# Patient Record
Sex: FEMALE | Race: WHITE | NOT HISPANIC OR LATINO
[De-identification: names, ages, dates, MRNs, and addresses within clinical notes are randomized per-mention and may not be internally consistent; named-entity substitution may affect disease eponyms.]

---

## 2017-03-19 ENCOUNTER — TRANSCRIPTION ENCOUNTER (OUTPATIENT)
Age: 67
End: 2017-03-19

## 2017-03-20 ENCOUNTER — OUTPATIENT (OUTPATIENT)
Dept: OUTPATIENT SERVICES | Facility: HOSPITAL | Age: 67
LOS: 1 days | End: 2017-03-20
Payer: COMMERCIAL

## 2017-03-20 DIAGNOSIS — Z12.11 ENCOUNTER FOR SCREENING FOR MALIGNANT NEOPLASM OF COLON: ICD-10-CM

## 2017-03-20 PROCEDURE — G0105: CPT

## 2018-04-12 PROBLEM — Z00.00 ENCOUNTER FOR PREVENTIVE HEALTH EXAMINATION: Status: ACTIVE | Noted: 2018-04-12

## 2018-04-13 ENCOUNTER — APPOINTMENT (OUTPATIENT)
Dept: DERMATOLOGY | Facility: CLINIC | Age: 68
End: 2018-04-13
Payer: COMMERCIAL

## 2018-04-13 PROCEDURE — 99213 OFFICE O/P EST LOW 20 MIN: CPT

## 2018-12-01 ENCOUNTER — APPOINTMENT (OUTPATIENT)
Dept: DERMATOLOGY | Facility: CLINIC | Age: 68
End: 2018-12-01
Payer: COMMERCIAL

## 2018-12-01 ENCOUNTER — RESULT REVIEW (OUTPATIENT)
Age: 68
End: 2018-12-01

## 2018-12-01 PROCEDURE — 99213 OFFICE O/P EST LOW 20 MIN: CPT | Mod: 25

## 2018-12-01 PROCEDURE — 11422 EXC H-F-NK-SP B9+MARG 1.1-2: CPT

## 2018-12-01 PROCEDURE — 11100 BX SKIN SUBCUTANEOUS&/MUCOUS MEMBRANE 1 LESION: CPT | Mod: 59

## 2018-12-15 ENCOUNTER — APPOINTMENT (OUTPATIENT)
Dept: DERMATOLOGY | Facility: CLINIC | Age: 68
End: 2018-12-15
Payer: MEDICARE

## 2018-12-15 PROCEDURE — ZZZZZ: CPT

## 2021-12-03 ENCOUNTER — EMERGENCY (EMERGENCY)
Facility: HOSPITAL | Age: 71
LOS: 1 days | Discharge: HOME | End: 2021-12-03
Attending: EMERGENCY MEDICINE
Payer: MEDICARE

## 2021-12-03 VITALS
DIASTOLIC BLOOD PRESSURE: 67 MMHG | HEART RATE: 90 BPM | OXYGEN SATURATION: 98 % | SYSTOLIC BLOOD PRESSURE: 134 MMHG | RESPIRATION RATE: 18 BRPM | TEMPERATURE: 98 F

## 2021-12-03 VITALS
RESPIRATION RATE: 16 BRPM | HEART RATE: 101 BPM | DIASTOLIC BLOOD PRESSURE: 90 MMHG | WEIGHT: 192.02 LBS | TEMPERATURE: 98 F | SYSTOLIC BLOOD PRESSURE: 166 MMHG | OXYGEN SATURATION: 98 %

## 2021-12-03 DIAGNOSIS — D23.5 OTHER BENIGN NEOPLASM OF SKIN OF TRUNK: Chronic | ICD-10-CM

## 2021-12-03 LAB
ALBUMIN SERPL ELPH-MCNC: 4.5 G/DL — SIGNIFICANT CHANGE UP (ref 3.3–5.2)
ALP SERPL-CCNC: 98 U/L — SIGNIFICANT CHANGE UP (ref 40–120)
ALT FLD-CCNC: 14 U/L — SIGNIFICANT CHANGE UP
ANION GAP SERPL CALC-SCNC: 21 MMOL/L — HIGH (ref 5–17)
APPEARANCE UR: CLEAR — SIGNIFICANT CHANGE UP
APTT BLD: 29 SEC — SIGNIFICANT CHANGE UP (ref 27.5–35.5)
AST SERPL-CCNC: 18 U/L — SIGNIFICANT CHANGE UP
BACTERIA # UR AUTO: NEGATIVE — SIGNIFICANT CHANGE UP
BASOPHILS # BLD AUTO: 0.04 K/UL — SIGNIFICANT CHANGE UP (ref 0–0.2)
BASOPHILS NFR BLD AUTO: 0.6 % — SIGNIFICANT CHANGE UP (ref 0–2)
BILIRUB SERPL-MCNC: 0.3 MG/DL — LOW (ref 0.4–2)
BILIRUB UR-MCNC: NEGATIVE — SIGNIFICANT CHANGE UP
BUN SERPL-MCNC: 12.3 MG/DL — SIGNIFICANT CHANGE UP (ref 8–20)
CALCIUM SERPL-MCNC: 9.8 MG/DL — SIGNIFICANT CHANGE UP (ref 8.6–10.2)
CHLORIDE SERPL-SCNC: 106 MMOL/L — SIGNIFICANT CHANGE UP (ref 98–107)
CO2 SERPL-SCNC: 21 MMOL/L — LOW (ref 22–29)
COLOR SPEC: YELLOW — SIGNIFICANT CHANGE UP
CREAT SERPL-MCNC: 0.68 MG/DL — SIGNIFICANT CHANGE UP (ref 0.5–1.3)
DIFF PNL FLD: NEGATIVE — SIGNIFICANT CHANGE UP
EOSINOPHIL # BLD AUTO: 0.07 K/UL — SIGNIFICANT CHANGE UP (ref 0–0.5)
EOSINOPHIL NFR BLD AUTO: 1 % — SIGNIFICANT CHANGE UP (ref 0–6)
EPI CELLS # UR: SIGNIFICANT CHANGE UP
GLUCOSE SERPL-MCNC: 121 MG/DL — HIGH (ref 70–99)
GLUCOSE UR QL: NEGATIVE MG/DL — SIGNIFICANT CHANGE UP
HCT VFR BLD CALC: 43.9 % — SIGNIFICANT CHANGE UP (ref 34.5–45)
HGB BLD-MCNC: 14.3 G/DL — SIGNIFICANT CHANGE UP (ref 11.5–15.5)
IMM GRANULOCYTES NFR BLD AUTO: 0.1 % — SIGNIFICANT CHANGE UP (ref 0–1.5)
INR BLD: 1.04 RATIO — SIGNIFICANT CHANGE UP (ref 0.88–1.16)
KETONES UR-MCNC: NEGATIVE — SIGNIFICANT CHANGE UP
LEUKOCYTE ESTERASE UR-ACNC: NEGATIVE — SIGNIFICANT CHANGE UP
LYMPHOCYTES # BLD AUTO: 1.84 K/UL — SIGNIFICANT CHANGE UP (ref 1–3.3)
LYMPHOCYTES # BLD AUTO: 27.1 % — SIGNIFICANT CHANGE UP (ref 13–44)
MCHC RBC-ENTMCNC: 29.9 PG — SIGNIFICANT CHANGE UP (ref 27–34)
MCHC RBC-ENTMCNC: 32.6 GM/DL — SIGNIFICANT CHANGE UP (ref 32–36)
MCV RBC AUTO: 91.8 FL — SIGNIFICANT CHANGE UP (ref 80–100)
MONOCYTES # BLD AUTO: 0.32 K/UL — SIGNIFICANT CHANGE UP (ref 0–0.9)
MONOCYTES NFR BLD AUTO: 4.7 % — SIGNIFICANT CHANGE UP (ref 2–14)
NEUTROPHILS # BLD AUTO: 4.5 K/UL — SIGNIFICANT CHANGE UP (ref 1.8–7.4)
NEUTROPHILS NFR BLD AUTO: 66.5 % — SIGNIFICANT CHANGE UP (ref 43–77)
NITRITE UR-MCNC: NEGATIVE — SIGNIFICANT CHANGE UP
PH UR: 6.5 — SIGNIFICANT CHANGE UP (ref 5–8)
PLATELET # BLD AUTO: 275 K/UL — SIGNIFICANT CHANGE UP (ref 150–400)
POTASSIUM SERPL-MCNC: 4.3 MMOL/L — SIGNIFICANT CHANGE UP (ref 3.5–5.3)
POTASSIUM SERPL-SCNC: 4.3 MMOL/L — SIGNIFICANT CHANGE UP (ref 3.5–5.3)
PROT SERPL-MCNC: 8 G/DL — SIGNIFICANT CHANGE UP (ref 6.6–8.7)
PROT UR-MCNC: 15 MG/DL
PROTHROM AB SERPL-ACNC: 12 SEC — SIGNIFICANT CHANGE UP (ref 10.6–13.6)
RBC # BLD: 4.78 M/UL — SIGNIFICANT CHANGE UP (ref 3.8–5.2)
RBC # FLD: 12.2 % — SIGNIFICANT CHANGE UP (ref 10.3–14.5)
RBC CASTS # UR COMP ASSIST: NEGATIVE /HPF — SIGNIFICANT CHANGE UP (ref 0–4)
SODIUM SERPL-SCNC: 148 MMOL/L — HIGH (ref 135–145)
SP GR SPEC: 1.01 — SIGNIFICANT CHANGE UP (ref 1.01–1.02)
TROPONIN T SERPL-MCNC: <0.01 NG/ML — SIGNIFICANT CHANGE UP (ref 0–0.06)
UROBILINOGEN FLD QL: NEGATIVE MG/DL — SIGNIFICANT CHANGE UP
WBC # BLD: 6.78 K/UL — SIGNIFICANT CHANGE UP (ref 3.8–10.5)
WBC # FLD AUTO: 6.78 K/UL — SIGNIFICANT CHANGE UP (ref 3.8–10.5)
WBC UR QL: NEGATIVE — SIGNIFICANT CHANGE UP

## 2021-12-03 PROCEDURE — 71045 X-RAY EXAM CHEST 1 VIEW: CPT | Mod: 26

## 2021-12-03 PROCEDURE — 93005 ELECTROCARDIOGRAM TRACING: CPT

## 2021-12-03 PROCEDURE — 85610 PROTHROMBIN TIME: CPT

## 2021-12-03 PROCEDURE — 93010 ELECTROCARDIOGRAM REPORT: CPT

## 2021-12-03 PROCEDURE — 84484 ASSAY OF TROPONIN QUANT: CPT

## 2021-12-03 PROCEDURE — 99285 EMERGENCY DEPT VISIT HI MDM: CPT

## 2021-12-03 PROCEDURE — 81001 URINALYSIS AUTO W/SCOPE: CPT

## 2021-12-03 PROCEDURE — 80053 COMPREHEN METABOLIC PANEL: CPT

## 2021-12-03 PROCEDURE — 70450 CT HEAD/BRAIN W/O DYE: CPT | Mod: 26,MA

## 2021-12-03 PROCEDURE — 85730 THROMBOPLASTIN TIME PARTIAL: CPT

## 2021-12-03 PROCEDURE — 82962 GLUCOSE BLOOD TEST: CPT

## 2021-12-03 PROCEDURE — 71045 X-RAY EXAM CHEST 1 VIEW: CPT

## 2021-12-03 PROCEDURE — 70450 CT HEAD/BRAIN W/O DYE: CPT | Mod: MA

## 2021-12-03 PROCEDURE — 99284 EMERGENCY DEPT VISIT MOD MDM: CPT | Mod: 25

## 2021-12-03 PROCEDURE — 36415 COLL VENOUS BLD VENIPUNCTURE: CPT

## 2021-12-03 PROCEDURE — 85025 COMPLETE CBC W/AUTO DIFF WBC: CPT

## 2021-12-03 RX ORDER — MECLIZINE HCL 12.5 MG
25 TABLET ORAL ONCE
Refills: 0 | Status: COMPLETED | OUTPATIENT
Start: 2021-12-03 | End: 2021-12-03

## 2021-12-03 RX ORDER — MECLIZINE HCL 12.5 MG
1 TABLET ORAL
Qty: 30 | Refills: 0
Start: 2021-12-03 | End: 2021-12-12

## 2021-12-03 RX ORDER — SODIUM CHLORIDE 9 MG/ML
1000 INJECTION INTRAMUSCULAR; INTRAVENOUS; SUBCUTANEOUS
Refills: 0 | Status: DISCONTINUED | OUTPATIENT
Start: 2021-12-03 | End: 2021-12-07

## 2021-12-03 RX ORDER — SODIUM CHLORIDE 9 MG/ML
1000 INJECTION INTRAMUSCULAR; INTRAVENOUS; SUBCUTANEOUS ONCE
Refills: 0 | Status: COMPLETED | OUTPATIENT
Start: 2021-12-03 | End: 2021-12-03

## 2021-12-03 RX ORDER — DIAZEPAM 5 MG
2 TABLET ORAL ONCE
Refills: 0 | Status: DISCONTINUED | OUTPATIENT
Start: 2021-12-03 | End: 2021-12-03

## 2021-12-03 RX ADMIN — SODIUM CHLORIDE 1000 MILLILITER(S): 9 INJECTION INTRAMUSCULAR; INTRAVENOUS; SUBCUTANEOUS at 16:32

## 2021-12-03 RX ADMIN — SODIUM CHLORIDE 100 MILLILITER(S): 9 INJECTION INTRAMUSCULAR; INTRAVENOUS; SUBCUTANEOUS at 14:44

## 2021-12-03 RX ADMIN — Medication 25 MILLIGRAM(S): at 16:32

## 2021-12-03 RX ADMIN — Medication 2 MILLIGRAM(S): at 14:41

## 2021-12-03 NOTE — ED PROVIDER NOTE - NS ED ROS FT
Gen: chills this morning; no recent illness  HEENT: no vision changes; slight sore throat  CV: Denies chest pain, palpitations  Skin: Denies rash, erythema, color changes  Resp: Denies SOB, cough  GI: nausea, loose stools; no abdominal pain  Msk: L arm tingling  : Denies dysuria, increased frequency  Neuro: dizziness and room spinning per HPI; no LOC reported, no visual changes  ROS statement: all other ROS negative except as per HPI

## 2021-12-03 NOTE — ED ADULT NURSE NOTE - CHIEF COMPLAINT QUOTE
Pt arrives from her PMD's office speaking coherently and following commands and states she has had on and off dizziness for "years" but yesterday had sudden onset dizziness with "room spinning" and generalized b/l weakness. Pt reports improvement at this time and is noted to have equal strength bilaterally.

## 2021-12-03 NOTE — ED PROVIDER NOTE - PATIENT PORTAL LINK FT
You can access the FollowMyHealth Patient Portal offered by [facility] by registering at the following website: [email or web address]. By joining [facility] FollowMyHealth portal, you will also be able to view your health information using other applications (apps) compatible with our system.

## 2021-12-03 NOTE — ED PROVIDER NOTE - ATTENDING CONTRIBUTION TO CARE
Pt with long hx dizziness presents to ER with dizziness and abnormal ekg  Pt went to sleep around midnight. She woke up and turned onto her side and became dizzy and sweaty. This was followed by nausea. She sat up for a minute and then went to the bathroom where she sat down and leaned forward. This is what she does when she gets dizzy and she states it helps her. She went downstairs and sat in the lounge chair and had coffee. She called her pcp Dr Hyman who had her go to the office. In the office she had an ekg that was "abnormal" with ST changes ( requested to send to ER not yet received). Pt unsure if she had tingling in her LUE with episode. Pt FHX + MI mother cause of death. PE as doc Need CT EKG CXR lab

## 2021-12-03 NOTE — ED ADULT NURSE NOTE - NSIMPLEMENTINTERV_GEN_ALL_ED
Implemented All Universal Safety Interventions:  [place] to call system. Call bell, personal items and telephone within reach. Instruct patient to call for assistance. Room bathroom lighting operational. Non-slip footwear when patient is off stretcher. Physically safe environment: no spills, clutter or unnecessary equipment. Stretcher in lowest position, wheels locked, appropriate side rails in place.

## 2021-12-03 NOTE — ED PROVIDER NOTE - OBJECTIVE STATEMENT
70 y/o active woman PMHx of Htn, vertigo, was sent to ED from PMD for concern of EKG changes after she experienced nausea, sweating, and dizziness this morning after waking up. Pt says she has had symptoms of dizziness for years, exacerbated by position/lying flat. Recently she has been functional, biking several miles yesterday per usual. Yesterday she woke up and felt transiently dizzy. This AM she experienced a room spinning sensation and started sweating/felt nauseous when she woke with some tingling in her left arm/hand. She made her way to the toilet and had "loose stools" unsure of appearance. She felt better after showering/getting dress and drove herself to her PMD's office. was sent here for concerning EKG changes. Denies visual disturbances and no LOC.  (+) nausea/sweating/brief chills/loose stools, room spinning  (-) CP, SOB, trouble with exertion, recent illness, recent head trauma, LOC  Dr. Jina Hyman said ST changes seen; did not have opportunity to inquire about pt medical/surgical Hx of prior chest tumor/ Hx of abnormal brain MRI on phone 70 y/o active woman PMHx of Htn, vertigo, was sent to ED from PMD for concern of EKG changes after she experienced nausea, sweating, and dizziness this morning after waking up. Pt says she has had symptoms of dizziness for years, exacerbated by position/lying flat. Recently she has been functional, biking several miles yesterday per usual. Yesterday she woke up and felt transiently dizzy. This AM she experienced a room spinning sensation and started sweating/felt nauseous when she woke with some tingling in her left arm/hand. She made her way to the toilet and had "loose stools" unsure of appearance. She felt better after showering/getting dress and drove herself to her PMD's office. was sent here for concerning EKG changes. Denies visual disturbances and no LOC.  (+) nausea/sweating/brief chills/loose stools, room spinning  (-) CP, SOB, trouble with exertion, recent illness, recent head trauma, LOC  Dr. Jina Hyman said ST changes seen; did not have opportunity to inquire about pt medical/surgical Hx of breast cyst/Hx of abnormal brain MRI on phone 72 y/o active woman PMHx of Htn, vertigo, was sent to ED from PMD for concern of EKG changes after she experienced nausea, sweating, and dizziness this morning after waking up. Pt says she has had symptoms of dizziness for years, exacerbated by position/lying flat. Recently she has been functional, biking several miles yesterday per usual. Yesterday she woke up and felt transiently dizzy. This AM she experienced a room spinning sensation and started sweating/felt nauseous when she woke with some tingling in her left arm/hand. She made her way to the toilet and had "loose stools" unsure of appearance. She felt better after showering/getting dress and drove herself to her PMD's office. was sent here for concerning EKG changes. Denies visual disturbances and no LOC.  (+) nausea/sweating/brief chills/loose stools, room spinning  (-) CP, SOB, trouble with exertion, recent illness, recent head trauma, LOC  Dr. Jina Hyman said ST changes seen; pt taking ramipril 10; previous 2016 Brain MRI with "parietal white matter changes"

## 2021-12-03 NOTE — ED PROVIDER NOTE - NEUROLOGICAL, MLM
alert and oriented; CN II - XII intact; extremity strength grossly intact alert and oriented; CN II - XII intact; extremity strength grossly intact Horizontal nystagmus ou

## 2021-12-03 NOTE — ED ADULT NURSE NOTE - OBJECTIVE STATEMENT
A&Ox3. sitting up in stretcher presents to ED with dizziness  placed on cardiac monitoring  no s/s of distress  awaiting further MD eval and disposition

## 2021-12-03 NOTE — ED PROVIDER NOTE - CARE PROVIDER_API CALL
Ambrocio Andrade (DO)  Otolaryngology  [address] [place] [address], [place] [address]  Phone: [phone]  Fax: [phone]  Follow Up Time:

## 2021-12-03 NOTE — ED PROVIDER NOTE - CLINICAL SUMMARY MEDICAL DECISION MAKING FREE TEXT BOX
70 y/o woman, Hx HTN and vertigo, presenting with nausea/dizziness/diaphoresis and L arm tingling with abnormal EKG ST changes detected at PMD. Exam reveals tachycardia. R/O ACS and stroke, less likely. Consider infectious contributor. EKG/upright CXR/trop/CMP/CBC/UA/ CT Head

## 2021-12-03 NOTE — ED PROVIDER NOTE - PROGRESS NOTE DETAILS
Pt no longer feeling nauseated, still dizzy. Was able to tolerate positional changes with minimal symptoms, no longer tachycardic.  EKG shows Pt no longer feeling symptoms, expressing concerns that symptoms will recur if she lies flat or when she wakes up again tomorrow morning. Was able to tolerate moderate/slow positional changes with minimal symptoms during exam. Administered meclozine for symptom control.  Tachycardia resolved. L hand tingling resolved with removing watch on L wrist. Trop neg; EKG showed sinus rhythm with subtle L axis deviation, normal intervals, no ST changes, to T wave inversions, poor R wave progression. Discharge pending CT Head.   EKG shows

## 2021-12-03 NOTE — ED PROVIDER NOTE - NSFOLLOWUPINSTRUCTIONS_ED_ALL_ED_FT
Antivert ( meclizine) one pill three times a day for dizziness  May cause drowsiness  Please follow up with the ENT specialist, Dr Andrade

## 2022-10-10 PROBLEM — E78.5 HYPERLIPIDEMIA, UNSPECIFIED: Chronic | Status: ACTIVE | Noted: 2021-12-03

## 2022-10-10 PROBLEM — R42 DIZZINESS AND GIDDINESS: Chronic | Status: ACTIVE | Noted: 2021-12-03

## 2022-11-04 ENCOUNTER — APPOINTMENT (OUTPATIENT)
Dept: DERMATOLOGY | Facility: CLINIC | Age: 72
End: 2022-11-04

## 2022-11-04 PROCEDURE — 11900 INJECT SKIN LESIONS </W 7: CPT

## 2022-11-04 PROCEDURE — 99203 OFFICE O/P NEW LOW 30 MIN: CPT | Mod: 25

## 2022-12-13 ENCOUNTER — RESULT REVIEW (OUTPATIENT)
Age: 72
End: 2022-12-13

## 2022-12-14 ENCOUNTER — APPOINTMENT (OUTPATIENT)
Dept: DERMATOLOGY | Facility: CLINIC | Age: 72
End: 2022-12-14

## 2022-12-14 PROCEDURE — 10040 EXTRACTION: CPT

## 2022-12-14 PROCEDURE — 13101 CMPLX RPR TRUNK 2.6-7.5 CM: CPT | Mod: 59

## 2022-12-14 PROCEDURE — 11402 EXC TR-EXT B9+MARG 1.1-2 CM: CPT

## 2022-12-14 PROCEDURE — 99212 OFFICE O/P EST SF 10 MIN: CPT | Mod: 25

## 2022-12-16 ENCOUNTER — APPOINTMENT (OUTPATIENT)
Dept: DERMATOLOGY | Facility: CLINIC | Age: 72
End: 2022-12-16

## 2022-12-28 ENCOUNTER — APPOINTMENT (OUTPATIENT)
Dept: DERMATOLOGY | Facility: CLINIC | Age: 72
End: 2022-12-28

## 2022-12-28 PROCEDURE — ZZZZZ: CPT

## 2023-03-27 ENCOUNTER — OFFICE (OUTPATIENT)
Dept: URBAN - METROPOLITAN AREA CLINIC 115 | Facility: CLINIC | Age: 73
Setting detail: OPHTHALMOLOGY
End: 2023-03-27
Payer: MEDICARE

## 2023-03-27 DIAGNOSIS — H52.11: ICD-10-CM

## 2023-03-27 DIAGNOSIS — H25.13: ICD-10-CM

## 2023-03-27 DIAGNOSIS — H16.223: ICD-10-CM

## 2023-03-27 DIAGNOSIS — H43.811: ICD-10-CM

## 2023-03-27 DIAGNOSIS — H02.403: ICD-10-CM

## 2023-03-27 PROCEDURE — 92012 INTRM OPH EXAM EST PATIENT: CPT | Performed by: OPHTHALMOLOGY

## 2023-03-27 ASSESSMENT — REFRACTION_CURRENTRX
OD_OVR_VA: 20/
OD_OVR_VA: 20/
OD_VPRISM_DIRECTION: PROGS
OS_AXIS: 065
OD_CYLINDER: -0.25
OD_ADD: +2.25
OD_SPHERE: -0.25
OS_ADD: +2.50
OS_CYLINDER: -0.50
OD_CYLINDER: 0.00
OD_SPHERE: -0.75
OD_VPRISM_DIRECTION: PROGS
OS_AXIS: 180
OS_OVR_VA: 20/
OS_VPRISM_DIRECTION: PROGS
OD_AXIS: 121
OD_AXIS: 180
OD_ADD: +2.50
OS_SPHERE: +0.50
OS_CYLINDER: 0.00
OS_ADD: +2.25
OS_VPRISM_DIRECTION: PROGS
OS_SPHERE: PLANO
OS_OVR_VA: 20/

## 2023-03-27 ASSESSMENT — CONFRONTATIONAL VISUAL FIELD TEST (CVF)
OD_FINDINGS: FULL
OS_FINDINGS: FULL

## 2023-03-27 ASSESSMENT — REFRACTION_MANIFEST
OS_ADD: +2.50
OS_VA1: 20/20-
OS_ADD: +2.50
OD_VA1: 30+
OD_VA1: 20/30-
OD_SPHERE: -0.75
OD_SPHERE: -2.00
OS_SPHERE: PL
OD_CYLINDER: -0.50
OS_VA1: 20/25
OD_ADD: +2.50
OS_SPHERE: +0.50
OS_AXIS: 75
OD_ADD: +2.50
OS_CYLINDER: -0.50
OD_AXIS: 165

## 2023-03-27 ASSESSMENT — SUPERFICIAL PUNCTATE KERATITIS (SPK)
OD_SPK: T
OS_SPK: T

## 2023-03-27 ASSESSMENT — TONOMETRY
OD_IOP_MMHG: 18
OS_IOP_MMHG: 21

## 2023-03-27 ASSESSMENT — VISUAL ACUITY
OS_BCVA: 20/40-
OD_BCVA: 20/20-2

## 2023-03-27 ASSESSMENT — REFRACTION_AUTOREFRACTION
OS_SPHERE: +0.75
OD_AXIS: 165
OD_CYLINDER: -1.25
OD_SPHERE: -1.75

## 2023-03-27 ASSESSMENT — SPHEQUIV_DERIVED
OD_SPHEQUIV: -2.25
OD_SPHEQUIV: -2.375
OS_SPHEQUIV: 0.25

## 2023-03-27 ASSESSMENT — LID POSITION - PTOSIS
OS_PTOSIS: 1+
OD_PTOSIS: 1+

## 2023-03-27 ASSESSMENT — KERATOMETRY: METHOD_AUTO_MANUAL: AUTO

## 2023-10-09 ENCOUNTER — OFFICE (OUTPATIENT)
Dept: URBAN - METROPOLITAN AREA CLINIC 115 | Facility: CLINIC | Age: 73
Setting detail: OPHTHALMOLOGY
End: 2023-10-09
Payer: MEDICARE

## 2023-10-09 DIAGNOSIS — H52.4: ICD-10-CM

## 2023-10-09 DIAGNOSIS — H52.11: ICD-10-CM

## 2023-10-09 DIAGNOSIS — H16.223: ICD-10-CM

## 2023-10-09 DIAGNOSIS — H43.811: ICD-10-CM

## 2023-10-09 DIAGNOSIS — H25.13: ICD-10-CM

## 2023-10-09 DIAGNOSIS — H02.403: ICD-10-CM

## 2023-10-09 PROCEDURE — 99213 OFFICE O/P EST LOW 20 MIN: CPT | Performed by: OPHTHALMOLOGY

## 2023-10-09 PROCEDURE — 92134 CPTRZ OPH DX IMG PST SGM RTA: CPT | Performed by: OPHTHALMOLOGY

## 2023-10-09 ASSESSMENT — REFRACTION_AUTOREFRACTION
OS_AXIS: 000
OS_CYLINDER: 0.00
OD_AXIS: 145
OS_SPHERE: +0.25
OD_CYLINDER: -1.00
OD_SPHERE: -1.00

## 2023-10-09 ASSESSMENT — SPHEQUIV_DERIVED
OD_SPHEQUIV: -1.5
OD_SPHEQUIV: -2.25
OS_SPHEQUIV: 0.25
OS_SPHEQUIV: 0.25

## 2023-10-09 ASSESSMENT — REFRACTION_CURRENTRX
OS_AXIS: 065
OD_OVR_VA: 20/
OD_AXIS: 180
OS_ADD: +2.25
OD_VPRISM_DIRECTION: PROGS
OD_OVR_VA: 20/
OD_AXIS: 121
OS_CYLINDER: -0.50
OD_CYLINDER: 0.00
OS_CYLINDER: 0.00
OD_VPRISM_DIRECTION: PROGS
OS_VPRISM_DIRECTION: PROGS
OS_SPHERE: +0.50
OS_VPRISM_DIRECTION: PROGS
OD_ADD: +2.25
OD_SPHERE: -0.75
OS_OVR_VA: 20/
OS_OVR_VA: 20/
OD_SPHERE: -0.25
OD_ADD: +2.50
OS_ADD: +2.50
OS_SPHERE: PLANO
OD_CYLINDER: -0.25
OS_AXIS: 180

## 2023-10-09 ASSESSMENT — REFRACTION_MANIFEST
OD_VA1: 20/30-
OS_VA1: 20/25
OD_VA1: 30+
OD_SPHERE: -0.75
OS_SPHERE: PL
OD_ADD: +2.50
OD_CYLINDER: -0.50
OS_VA1: 20/20-
OD_AXIS: 165
OD_ADD: +2.50
OS_AXIS: 75
OS_CYLINDER: -0.50
OS_ADD: +2.50
OD_SPHERE: -2.00
OS_ADD: +2.50
OS_SPHERE: +0.50

## 2023-10-09 ASSESSMENT — TONOMETRY
OD_IOP_MMHG: 18
OS_IOP_MMHG: 18

## 2023-10-09 ASSESSMENT — LID POSITION - PTOSIS
OS_PTOSIS: 1+
OD_PTOSIS: 1+

## 2023-10-09 ASSESSMENT — VISUAL ACUITY
OD_BCVA: 20/25-1
OS_BCVA: 20/30-1

## 2023-10-09 ASSESSMENT — SUPERFICIAL PUNCTATE KERATITIS (SPK)
OD_SPK: T
OS_SPK: T

## 2023-10-09 ASSESSMENT — KERATOMETRY: METHOD_AUTO_MANUAL: AUTO

## 2023-11-09 ENCOUNTER — APPOINTMENT (OUTPATIENT)
Dept: DERMATOLOGY | Facility: CLINIC | Age: 73
End: 2023-11-09
Payer: MEDICARE

## 2023-11-09 PROCEDURE — 99213 OFFICE O/P EST LOW 20 MIN: CPT

## 2024-03-18 ENCOUNTER — OFFICE (OUTPATIENT)
Dept: URBAN - METROPOLITAN AREA CLINIC 115 | Facility: CLINIC | Age: 74
Setting detail: OPHTHALMOLOGY
End: 2024-03-18
Payer: MEDICARE

## 2024-03-18 DIAGNOSIS — H25.13: ICD-10-CM

## 2024-03-18 DIAGNOSIS — H25.12: ICD-10-CM

## 2024-03-18 PROBLEM — H35.033 HYPERTENSIVE RETINOPATHY; BOTH EYES: Status: ACTIVE | Noted: 2024-03-18

## 2024-03-18 PROCEDURE — 92136 OPHTHALMIC BIOMETRY: CPT | Mod: LT | Performed by: OPHTHALMOLOGY

## 2024-03-18 PROCEDURE — 99214 OFFICE O/P EST MOD 30 MIN: CPT | Performed by: OPHTHALMOLOGY

## 2024-03-18 ASSESSMENT — LID POSITION - PTOSIS
OS_PTOSIS: 1+
OD_PTOSIS: 1+

## 2024-03-22 ASSESSMENT — SPHEQUIV_DERIVED
OD_SPHEQUIV: -2.25
OS_SPHEQUIV: 0.25

## 2024-03-22 ASSESSMENT — REFRACTION_MANIFEST
OS_AXIS: 75
OD_SPHERE: -2.00
OD_ADD: +2.50
OD_ADD: +2.50
OD_VA1: 30+
OD_VA1: 20/30-
OD_CYLINDER: -0.50
OS_VA1: 20/20-
OD_SPHERE: -0.75
OD_AXIS: 165
OS_ADD: +2.50
OS_ADD: +2.50
OS_VA1: 20/25
OS_CYLINDER: -0.50
OS_SPHERE: PL
OS_SPHERE: +0.50

## 2024-03-22 ASSESSMENT — REFRACTION_CURRENTRX
OD_SPHERE: -0.75
OD_AXIS: 121
OD_CYLINDER: 0.00
OD_AXIS: 180
OS_OVR_VA: 20/
OD_CYLINDER: -0.25
OS_ADD: +2.50
OD_ADD: +2.50
OS_CYLINDER: -0.50
OS_SPHERE: +0.50
OS_CYLINDER: 0.00
OS_AXIS: 065
OD_VPRISM_DIRECTION: PROGS
OS_AXIS: 180
OD_OVR_VA: 20/
OD_VPRISM_DIRECTION: PROGS
OS_VPRISM_DIRECTION: PROGS
OD_OVR_VA: 20/
OD_ADD: +2.25
OS_VPRISM_DIRECTION: PROGS
OD_SPHERE: -0.25
OS_SPHERE: PLANO
OS_ADD: +2.25
OS_OVR_VA: 20/

## 2024-07-11 ENCOUNTER — ASC (OUTPATIENT)
Dept: URBAN - METROPOLITAN AREA SURGERY 8 | Facility: SURGERY | Age: 74
Setting detail: OPHTHALMOLOGY
End: 2024-07-11
Payer: MEDICARE

## 2024-07-11 DIAGNOSIS — H25.11: ICD-10-CM

## 2024-07-11 DIAGNOSIS — H25.011: ICD-10-CM

## 2024-07-11 DIAGNOSIS — H52.211: ICD-10-CM

## 2024-07-11 DIAGNOSIS — H25.041: ICD-10-CM

## 2024-07-11 PROCEDURE — VIVITY VIVITY: Performed by: OPHTHALMOLOGY

## 2024-07-11 PROCEDURE — 66984 XCAPSL CTRC RMVL W/O ECP: CPT | Mod: RT | Performed by: OPHTHALMOLOGY

## 2024-07-11 PROCEDURE — FEMTO PRECISION LASER CATARACT SURGERY: Mod: GY | Performed by: OPHTHALMOLOGY

## 2024-07-12 ENCOUNTER — OFFICE (OUTPATIENT)
Dept: URBAN - METROPOLITAN AREA CLINIC 115 | Facility: CLINIC | Age: 74
Setting detail: OPHTHALMOLOGY
End: 2024-07-12
Payer: MEDICARE

## 2024-07-12 DIAGNOSIS — Z96.1: ICD-10-CM

## 2024-07-12 DIAGNOSIS — H25.12: ICD-10-CM

## 2024-07-12 PROCEDURE — 92136 OPHTHALMIC BIOMETRY: CPT | Mod: LT | Performed by: OPHTHALMOLOGY

## 2024-07-12 PROCEDURE — 99024 POSTOP FOLLOW-UP VISIT: CPT | Performed by: OPHTHALMOLOGY

## 2024-07-12 ASSESSMENT — LID POSITION - PTOSIS
OS_PTOSIS: 1+
OD_PTOSIS: 1+

## 2024-07-12 ASSESSMENT — CONFRONTATIONAL VISUAL FIELD TEST (CVF)
OD_FINDINGS: FULL
OS_FINDINGS: FULL

## 2024-07-25 ENCOUNTER — ASC (OUTPATIENT)
Dept: URBAN - METROPOLITAN AREA SURGERY 8 | Facility: SURGERY | Age: 74
Setting detail: OPHTHALMOLOGY
End: 2024-07-25
Payer: MEDICARE

## 2024-07-25 DIAGNOSIS — H25.012: ICD-10-CM

## 2024-07-25 DIAGNOSIS — H52.212: ICD-10-CM

## 2024-07-25 DIAGNOSIS — H25.042: ICD-10-CM

## 2024-07-25 DIAGNOSIS — H25.12: ICD-10-CM

## 2024-07-25 PROCEDURE — 66984 XCAPSL CTRC RMVL W/O ECP: CPT | Mod: 79,LT | Performed by: OPHTHALMOLOGY

## 2024-07-25 PROCEDURE — VIVITY VIVITY: Performed by: OPHTHALMOLOGY

## 2024-07-25 PROCEDURE — FEMTO FEMTOSECOND LASER: Mod: GY | Performed by: OPHTHALMOLOGY

## 2024-07-26 ENCOUNTER — OFFICE (OUTPATIENT)
Dept: URBAN - METROPOLITAN AREA CLINIC 115 | Facility: CLINIC | Age: 74
Setting detail: OPHTHALMOLOGY
End: 2024-07-26
Payer: MEDICARE

## 2024-07-26 DIAGNOSIS — Z96.1: ICD-10-CM

## 2024-07-26 PROCEDURE — 99024 POSTOP FOLLOW-UP VISIT: CPT | Performed by: OPTOMETRIST

## 2024-07-26 ASSESSMENT — LID POSITION - PTOSIS
OS_PTOSIS: 1+
OD_PTOSIS: 1+

## 2024-07-26 ASSESSMENT — CONFRONTATIONAL VISUAL FIELD TEST (CVF)
OD_FINDINGS: FULL
OS_FINDINGS: FULL

## 2024-08-09 ENCOUNTER — RX ONLY (RX ONLY)
Age: 74
End: 2024-08-09

## 2024-08-09 ENCOUNTER — OFFICE (OUTPATIENT)
Dept: URBAN - METROPOLITAN AREA CLINIC 115 | Facility: CLINIC | Age: 74
Setting detail: OPHTHALMOLOGY
End: 2024-08-09
Payer: MEDICARE

## 2024-08-09 DIAGNOSIS — Z96.1: ICD-10-CM

## 2024-08-09 PROCEDURE — 99024 POSTOP FOLLOW-UP VISIT: CPT | Performed by: OPHTHALMOLOGY

## 2024-08-09 ASSESSMENT — LID POSITION - PTOSIS
OS_PTOSIS: 1+
OD_PTOSIS: 1+

## 2024-08-09 ASSESSMENT — CONFRONTATIONAL VISUAL FIELD TEST (CVF)
OS_FINDINGS: FULL
OD_FINDINGS: FULL

## 2024-11-11 ENCOUNTER — APPOINTMENT (OUTPATIENT)
Dept: DERMATOLOGY | Facility: CLINIC | Age: 74
End: 2024-11-11
Payer: MEDICARE

## 2024-11-11 PROCEDURE — 99213 OFFICE O/P EST LOW 20 MIN: CPT

## 2024-12-02 ENCOUNTER — OFFICE (OUTPATIENT)
Dept: URBAN - METROPOLITAN AREA CLINIC 115 | Facility: CLINIC | Age: 74
Setting detail: OPHTHALMOLOGY
End: 2024-12-02
Payer: MEDICARE

## 2024-12-02 DIAGNOSIS — H16.223: ICD-10-CM

## 2024-12-02 DIAGNOSIS — Z96.1: ICD-10-CM

## 2024-12-02 PROCEDURE — 99213 OFFICE O/P EST LOW 20 MIN: CPT | Performed by: OPHTHALMOLOGY

## 2024-12-02 ASSESSMENT — REFRACTION_AUTOREFRACTION
OS_CYLINDER: -1.00
OD_AXIS: 156
OS_AXIS: 171
OS_SPHERE: -0.25
OD_CYLINDER: -0.25
OD_SPHERE: 0.00

## 2024-12-02 ASSESSMENT — REFRACTION_CURRENTRX
OD_SPHERE: -0.25
OD_OVR_VA: 20/
OS_OVR_VA: 20/
OS_VPRISM_DIRECTION: PROGS
OD_VPRISM_DIRECTION: PROGS
OS_AXIS: 065
OS_OVR_VA: 20/
OS_ADD: +2.25
OD_CYLINDER: 0.00
OD_CYLINDER: -0.25
OD_SPHERE: -0.75
OS_SPHERE: +0.50
OS_SPHERE: PLANO
OD_ADD: +2.25
OD_AXIS: 180
OS_OVR_VA: 20/
OD_VPRISM_DIRECTION: PROGS
OS_CYLINDER: 0.00
OD_AXIS: 121
OS_CYLINDER: -0.50
OS_SPHERE: PLANO
OD_SPHERE: -0.25
OS_CYLINDER: 0.00
OD_AXIS: 180
OD_OVR_VA: 20/
OD_ADD: +2.50
OD_CYLINDER: 0.00
OS_ADD: +2.50
OD_ADD: +2.25
OS_VPRISM_DIRECTION: PROGS
OS_AXIS: 180
OD_OVR_VA: 20/
OS_ADD: +2.25
OS_AXIS: 180

## 2024-12-02 ASSESSMENT — LID POSITION - PTOSIS
OD_PTOSIS: 1+
OS_PTOSIS: 1+

## 2024-12-02 ASSESSMENT — REFRACTION_MANIFEST
OD_VA1: 30+
OS_ADD: +2.50
OS_CYLINDER: -0.50
OS_VA1: 20/25
OS_SPHERE: PL
OS_SPHERE: +0.50
OS_ADD: +2.50
OD_ADD: +2.50
OS_VA1: 20/20-
OD_AXIS: 165
OS_AXIS: 75
OD_ADD: +2.50
OD_SPHERE: -0.75
OD_CYLINDER: -0.50
OD_VA1: 20/30-
OD_SPHERE: -2.00

## 2024-12-02 ASSESSMENT — VISUAL ACUITY
OD_BCVA: 20/20
OS_BCVA: 20/20-1

## 2024-12-02 ASSESSMENT — TONOMETRY
OD_IOP_MMHG: 16
OS_IOP_MMHG: 17

## 2024-12-02 ASSESSMENT — SUPERFICIAL PUNCTATE KERATITIS (SPK)
OD_SPK: T
OS_SPK: T

## 2024-12-02 ASSESSMENT — CONFRONTATIONAL VISUAL FIELD TEST (CVF)
OS_FINDINGS: FULL
OD_FINDINGS: FULL

## 2024-12-02 ASSESSMENT — KERATOMETRY: METHOD_AUTO_MANUAL: AUTO
